# Patient Record
Sex: MALE | Race: WHITE | NOT HISPANIC OR LATINO | Employment: FULL TIME | URBAN - METROPOLITAN AREA
[De-identification: names, ages, dates, MRNs, and addresses within clinical notes are randomized per-mention and may not be internally consistent; named-entity substitution may affect disease eponyms.]

---

## 2024-11-27 ENCOUNTER — OFFICE VISIT (OUTPATIENT)
Dept: URGENT CARE | Age: 39
End: 2024-11-27
Payer: COMMERCIAL

## 2024-11-27 VITALS
HEART RATE: 50 BPM | OXYGEN SATURATION: 97 % | TEMPERATURE: 98.4 F | SYSTOLIC BLOOD PRESSURE: 135 MMHG | DIASTOLIC BLOOD PRESSURE: 84 MMHG | RESPIRATION RATE: 16 BRPM

## 2024-11-27 DIAGNOSIS — J01.90 ACUTE SINUSITIS, RECURRENCE NOT SPECIFIED, UNSPECIFIED LOCATION: Primary | ICD-10-CM

## 2024-11-27 RX ORDER — AMOXICILLIN AND CLAVULANATE POTASSIUM 875; 125 MG/1; MG/1
1 TABLET, FILM COATED ORAL 2 TIMES DAILY
Qty: 14 TABLET | Refills: 0 | Status: SHIPPED | OUTPATIENT
Start: 2024-11-27 | End: 2024-12-04

## 2024-11-27 NOTE — PROGRESS NOTES
Subjective   History of Present Illness: Lauri Romo is a 39 y.o. male. They present today with a chief complaint of Sinusitis (Over 1 week trying OTC medications with no relief ( Sudafed and Ibupro) /Wife had a sinus infection and he believes to have gotten si from her ).          Past Medical History  Allergies as of 11/27/2024    (No Known Allergies)       (Not in a hospital admission)       Past Medical History:   Diagnosis Date    Paroxysmal atrial fibrillation (Multi) 11/05/2018    Paroxysmal atrial fibrillation with RVR    Personal history of other diseases of the circulatory system 11/21/2018    History of atrial fibrillation       Past Surgical History:   Procedure Laterality Date    OTHER SURGICAL HISTORY  01/07/2021    Vasectomy    TONSILLECTOMY  01/27/2016    Tonsillectomy            Review of Systems  Review of Systems                               Objective    Vitals:    11/27/24 1147   BP: 135/84   Pulse: 50   Resp: 16   Temp: 36.9 °C (98.4 °F)   SpO2: 97%     No LMP for male patient.    Physical Exam  Vitals reviewed.   HENT:      Head: Normocephalic and atraumatic.      Nose: Nose normal.      Mouth/Throat:      Mouth: Mucous membranes are moist.   Eyes:      Extraocular Movements: Extraocular movements intact.      Conjunctiva/sclera: Conjunctivae normal.      Pupils: Pupils are equal, round, and reactive to light.   Cardiovascular:      Rate and Rhythm: Normal rate and regular rhythm.   Pulmonary:      Effort: Pulmonary effort is normal.      Breath sounds: Normal breath sounds.   Skin:     General: Skin is warm.   Neurological:      Mental Status: He is alert and oriented to person, place, and time.   Psychiatric:         Mood and Affect: Mood normal.         Behavior: Behavior normal.             Point of Care Test & Imaging Results from this visit  No results found for this visit on 11/27/24.   No results found.    Diagnostic study results (if any) were reviewed by Warren Polo  LION.    Assessment/Plan   Allergies, medications, history, and pertinent labs/EKGs/Imaging reviewed by Warren Polo PA-C.     Medical Decision Making  -         Patient is educated about their diagnoses.     -          Discussed medications benefits and adverse effects.     -          Answered all patient’s questions.     -          Patient will call 911 or go to the nearest ED if worsen symptoms .     -          Patient is agreeable to the plan of care and is deemed stable upon discharge.     -          Follow up with your primary care provider in two days.      Orders and Diagnoses  Diagnoses and all orders for this visit:  Acute sinusitis, recurrence not specified, unspecified location  -     amoxicillin-pot clavulanate (Augmentin) 875-125 mg tablet; Take 1 tablet by mouth 2 times a day for 7 days.      Medical Admin Record      Patient disposition: Home    Electronically signed by Warren Polo PA-C  11:51 AM

## 2024-12-12 ENCOUNTER — OFFICE VISIT (OUTPATIENT)
Dept: URGENT CARE | Age: 39
End: 2024-12-12
Payer: COMMERCIAL

## 2024-12-12 VITALS
SYSTOLIC BLOOD PRESSURE: 132 MMHG | TEMPERATURE: 97.5 F | HEART RATE: 76 BPM | RESPIRATION RATE: 16 BRPM | DIASTOLIC BLOOD PRESSURE: 80 MMHG | OXYGEN SATURATION: 96 %

## 2024-12-12 DIAGNOSIS — R53.83 OTHER FATIGUE: ICD-10-CM

## 2024-12-12 DIAGNOSIS — J02.9 SORE THROAT: Primary | ICD-10-CM

## 2024-12-12 LAB
POC BINAX EXPIRATION: NORMAL
POC BINAX NOW COVID SERIAL NUMBER: NORMAL
POC RAPID MONO: NORMAL
POC RAPID STREP: NEGATIVE
POC SARS-COV-2 AG BINAX: NORMAL

## 2024-12-12 ASSESSMENT — ENCOUNTER SYMPTOMS
RHINORRHEA: 1
CARDIOVASCULAR NEGATIVE: 1
FATIGUE: 1
NEUROLOGICAL NEGATIVE: 1
SHORTNESS OF BREATH: 0
FEVER: 0
SINUS PAIN: 1
MUSCULOSKELETAL NEGATIVE: 1
PSYCHIATRIC NEGATIVE: 1
WHEEZING: 0
SORE THROAT: 1
COUGH: 1

## 2024-12-12 NOTE — PATIENT INSTRUCTIONS
Signs and symptoms consistent with acute viral pharyngitis without  evidence of PTA, mono, deep neck infection, or sepsis. Negative Rapid strep in office. Will treat  with supportive measures. Patient is encouraged to return to clinic if symptoms worsen and will  otherwise follow with PCP.

## 2024-12-12 NOTE — PROGRESS NOTES
"Subjective   Patient ID: Lauir Romo \"Gucci" is a 39 y.o. male. They present today with a chief complaint of Sore Throat (9 days).    History of Present Illness  38 yo male presents with c/o sore throat.  Reports that he was treated with Amoxicillin-Clavulanate for sinus infection for 10 days, treatment started 11/27/24.  States he got somewhat better but then last week his throat started hurting some, has been taking ibuprofen which has been helpful, then today he woke up with his throat in excruciating pain.  Feels tired, run down.  Occassional sl cough, no SOB.      Sore Throat   Associated symptoms include coughing. Pertinent negatives include no shortness of breath.       Past Medical History  Allergies as of 12/12/2024    (No Known Allergies)       (Not in a hospital admission)       Past Medical History:   Diagnosis Date    Paroxysmal atrial fibrillation (Multi) 11/05/2018    Paroxysmal atrial fibrillation with RVR    Personal history of other diseases of the circulatory system 11/21/2018    History of atrial fibrillation       Past Surgical History:   Procedure Laterality Date    OTHER SURGICAL HISTORY  01/07/2021    Vasectomy    TONSILLECTOMY  01/27/2016    Tonsillectomy            Review of Systems  Review of Systems   Constitutional:  Positive for fatigue. Negative for fever.   HENT:  Positive for postnasal drip, rhinorrhea, sinus pain and sore throat.    Respiratory:  Positive for cough. Negative for shortness of breath and wheezing.    Cardiovascular: Negative.    Musculoskeletal: Negative.    Skin: Negative.    Neurological: Negative.    Psychiatric/Behavioral: Negative.                                    Objective    Vitals:    12/12/24 0813   BP: 132/80   Pulse: 76   Resp: 16   Temp: 36.4 °C (97.5 °F)   SpO2: 96%     No LMP for male patient.    Physical Exam  Constitutional:       Appearance: He is ill-appearing.   HENT:      Right Ear: Tympanic membrane and ear canal normal.      Left Ear: " DXA scan ordered   Tympanic membrane and ear canal normal.      Nose: Rhinorrhea present.      Mouth/Throat:      Pharynx: Posterior oropharyngeal erythema present. No oropharyngeal exudate.   Eyes:      Extraocular Movements: Extraocular movements intact.      Pupils: Pupils are equal, round, and reactive to light.   Cardiovascular:      Rate and Rhythm: Normal rate and regular rhythm.   Pulmonary:      Effort: Pulmonary effort is normal.      Breath sounds: Normal breath sounds.   Abdominal:      General: Bowel sounds are normal.      Palpations: Abdomen is soft.   Musculoskeletal:         General: Normal range of motion.      Cervical back: No tenderness.   Lymphadenopathy:      Cervical: Cervical adenopathy present.   Skin:     General: Skin is warm and dry.   Neurological:      General: No focal deficit present.      Mental Status: He is alert and oriented to person, place, and time.   Psychiatric:         Mood and Affect: Mood normal.         Behavior: Behavior normal.         Thought Content: Thought content normal.         Judgment: Judgment normal.         Procedures    Point of Care Test & Imaging Results from this visit  No results found for this visit on 12/12/24.   No results found.    Diagnostic study results (if any) were reviewed by SABRINA Hawkins.    Assessment/Plan   Allergies, medications, history, and pertinent labs/EKGs/Imaging reviewed by SABRINA Hawkins.     Medical Decision Making  Signs and symptoms consistent with acute viral pharyngitis without  evidence of PTA, mono, deep neck infection, or sepsis. Negative Rapid strep in office. Will treat  with supportive measures. Patient is encouraged to return to clinic if symptoms worsen and will  otherwise follow with PCP.     Orders and Diagnoses  There are no diagnoses linked to this encounter.    Medical Admin Record      Patient disposition: Home    Electronically signed by SABRINA Hawkins  8:20 AM

## 2025-01-07 ENCOUNTER — OFFICE VISIT (OUTPATIENT)
Dept: URGENT CARE | Age: 40
End: 2025-01-07
Payer: COMMERCIAL

## 2025-01-07 VITALS
SYSTOLIC BLOOD PRESSURE: 135 MMHG | OXYGEN SATURATION: 97 % | HEART RATE: 56 BPM | DIASTOLIC BLOOD PRESSURE: 72 MMHG | RESPIRATION RATE: 18 BRPM

## 2025-01-07 DIAGNOSIS — J18.9 PNEUMONIA OF LEFT LOWER LOBE DUE TO INFECTIOUS ORGANISM: Primary | ICD-10-CM

## 2025-01-07 PROCEDURE — 99070 SPECIAL SUPPLIES PHYS/QHP: CPT | Performed by: NURSE PRACTITIONER

## 2025-01-07 PROCEDURE — 99214 OFFICE O/P EST MOD 30 MIN: CPT | Performed by: NURSE PRACTITIONER

## 2025-01-07 RX ORDER — DOXYCYCLINE 100 MG/1
100 CAPSULE ORAL 2 TIMES DAILY
Qty: 20 CAPSULE | Refills: 0 | Status: SHIPPED | OUTPATIENT
Start: 2025-01-07 | End: 2025-01-17

## 2025-01-07 RX ORDER — ALBUTEROL SULFATE 90 UG/1
2 INHALANT RESPIRATORY (INHALATION) EVERY 6 HOURS PRN
Qty: 18 G | Refills: 0 | Status: SHIPPED | OUTPATIENT
Start: 2025-01-07 | End: 2025-01-07 | Stop reason: SDUPTHER

## 2025-01-07 RX ORDER — PREDNISONE 20 MG/1
40 TABLET ORAL DAILY
Qty: 10 TABLET | Refills: 0 | Status: SHIPPED | OUTPATIENT
Start: 2025-01-07 | End: 2025-01-12

## 2025-01-07 RX ORDER — BENZONATATE 200 MG/1
200 CAPSULE ORAL 3 TIMES DAILY PRN
Qty: 30 CAPSULE | Refills: 0 | Status: SHIPPED | OUTPATIENT
Start: 2025-01-07 | End: 2025-01-17

## 2025-01-07 RX ORDER — ALBUTEROL SULFATE 90 UG/1
2 INHALANT RESPIRATORY (INHALATION) EVERY 6 HOURS PRN
Qty: 18 G | Refills: 0 | Status: SHIPPED | OUTPATIENT
Start: 2025-01-07

## 2025-01-07 RX ORDER — ALBUTEROL SULFATE 90 UG/1
2 INHALANT RESPIRATORY (INHALATION)
COMMUNITY
Start: 2019-02-12 | End: 2025-01-07 | Stop reason: SDUPTHER

## 2025-01-07 RX ORDER — ALBUTEROL SULFATE 90 UG/1
2 INHALANT RESPIRATORY (INHALATION) EVERY 4 HOURS PRN
Qty: 6.7 G | Refills: 0 | Status: SHIPPED | OUTPATIENT
Start: 2025-01-07 | End: 2025-01-21

## 2025-01-08 NOTE — PATIENT INSTRUCTIONS
Presumptively treating for community acquired pneumonia d/t  Signs, symptoms, examination,  No evidence of sepsis or acute respiratory distress at this time. Will treat with appropriate antibiotics for age group/risk factors. Patient is advised if symptoms change or worsen go to ED for further evaluation and care.   Follow up with PCP in 1-2 weeks

## 2025-01-08 NOTE — PROGRESS NOTES
SUBJECTIVE:   Derick Romo is a 39 y.o. male who complains of congestion, productive cough, and Wheezing  BOLA[place]OS for 10 days. He denies a history of chest pain, fatigue, and weakness and has a history of asthma. Patient denies smoke cigarettes.  Daughter and parents have pneumonia.     OBJECTIVE:  ENT:  General: Vitals noted, no distress, afebrile. Normal phonation, no stridor, no trismus  ENT: TMs clear effusion bilaterally, EACs unremarkable. Mastoids nontender. Posterior oropharynx without erythema, exudate, or swelling. Uvula is in the midline and non-edematous. No Jostin's angina.  Neck: Supple. No meningismus through full range of motion. No lymphadenopathy.   Cardiac: Regular rate and rhythm, no murmur.  Lungs: Diminished, rhonchi left lower lobe, other lung fields CTA  Abdomen: Soft, nontender, nonsurgical throughout. Normoactive bowel sounds.   Extremities: No peripheral edema  Skin: No rash  Neuro: No focal neurologic deficits. NIH score is 0.   ASSESSMENT:   asthma and pneumonia    PLAN:  Symptomatic therapy suggested: push fluids, rest, use vaporizer or mist prn, and return office visit prn if symptoms persist or worsen.  Call or return to clinic prn if these symptoms worsen or fail to improve as anticipated.